# Patient Record
Sex: FEMALE | URBAN - METROPOLITAN AREA
[De-identification: names, ages, dates, MRNs, and addresses within clinical notes are randomized per-mention and may not be internally consistent; named-entity substitution may affect disease eponyms.]

---

## 2023-10-17 ENCOUNTER — APPOINTMENT (RX ONLY)
Dept: URBAN - METROPOLITAN AREA CLINIC 87 | Facility: CLINIC | Age: 43
Setting detail: DERMATOLOGY
End: 2023-10-17

## 2023-10-17 DIAGNOSIS — L65.9 NONSCARRING HAIR LOSS, UNSPECIFIED: ICD-10-CM | Status: INADEQUATELY CONTROLLED

## 2023-10-17 PROCEDURE — ? ORDER TESTS

## 2023-10-17 PROCEDURE — ? PRESCRIPTION MEDICATION MANAGEMENT

## 2023-10-17 PROCEDURE — ? ADDITIONAL NOTES

## 2023-10-17 PROCEDURE — 99204 OFFICE O/P NEW MOD 45 MIN: CPT

## 2023-10-17 PROCEDURE — ? COUNSELING

## 2023-10-17 ASSESSMENT — LOCATION SIMPLE DESCRIPTION DERM
LOCATION SIMPLE: RIGHT FOREHEAD
LOCATION SIMPLE: LEFT SCALP
LOCATION SIMPLE: SCALP

## 2023-10-17 ASSESSMENT — LOCATION ZONE DERM
LOCATION ZONE: SCALP
LOCATION ZONE: FACE

## 2023-10-17 ASSESSMENT — LOCATION DETAILED DESCRIPTION DERM
LOCATION DETAILED: RIGHT SUPERIOR FOREHEAD
LOCATION DETAILED: LEFT CENTRAL FRONTAL SCALP
LOCATION DETAILED: LEFT SUPERIOR PARIETAL SCALP

## 2023-10-17 NOTE — PROCEDURE: ORDER TESTS
Bill For Surgical Tray: no
Billing Type: Third-Party Bill
Performing Laboratory: -[id number]
Expected Date Of Service: 10/17/2023
Lab Facility: [id number]

## 2023-10-17 NOTE — PROCEDURE: PRESCRIPTION MEDICATION MANAGEMENT
Render In Strict Bullet Format?: No
Initiate Treatment: Pending blood work : oral minoxidil or Spironolactone
Detail Level: Zone
Plan: Hair loss hand out given\\n\\nPt recommended Nutrafol and Butcher\\n\\nPt advised to reduce frequency of dying hair

## 2023-10-17 NOTE — HPI: HAIR LOSS
How Did The Hair Loss Occur?: gradual in onset
How Severe Is Your Hair Loss?: moderate
Additional History: Stated was traveling and noticed during that time they were shedding even more and a  mentioned how much hair was shedding. Had Covid two years ago

## 2023-10-17 NOTE — PROCEDURE: ADDITIONAL NOTES
Detail Level: Simple
Additional Notes: Pt states hx of thyroid medications for a year and did not notice improvement
Render Risk Assessment In Note?: no

## 2023-10-25 ENCOUNTER — RX ONLY (OUTPATIENT)
Age: 43
Setting detail: RX ONLY
End: 2023-10-25

## 2023-10-25 RX ORDER — MINOXIDIL 2.5 MG/1
TABLET ORAL
Qty: 30 | Refills: 1 | Status: ERX | COMMUNITY
Start: 2023-10-25

## 2023-11-01 ENCOUNTER — APPOINTMENT (RX ONLY)
Dept: URBAN - METROPOLITAN AREA CLINIC 87 | Facility: CLINIC | Age: 43
Setting detail: DERMATOLOGY
End: 2023-11-01

## 2023-11-01 DIAGNOSIS — Z71.89 OTHER SPECIFIED COUNSELING: ICD-10-CM

## 2023-11-01 DIAGNOSIS — L82.1 OTHER SEBORRHEIC KERATOSIS: ICD-10-CM

## 2023-11-01 DIAGNOSIS — L81.9 DISORDER OF PIGMENTATION, UNSPECIFIED: ICD-10-CM | Status: INADEQUATELY CONTROLLED

## 2023-11-01 PROCEDURE — ? PRESCRIPTION

## 2023-11-01 PROCEDURE — ? PRESCRIPTION MEDICATION MANAGEMENT

## 2023-11-01 PROCEDURE — ? ADDITIONAL NOTES

## 2023-11-01 PROCEDURE — ? COUNSELING

## 2023-11-01 PROCEDURE — ? SUNSCREEN RECOMMENDATIONS

## 2023-11-01 PROCEDURE — 99214 OFFICE O/P EST MOD 30 MIN: CPT

## 2023-11-01 RX ORDER — TRETIONIN 0.25 MG/G
CREAM TOPICAL DAILY
Qty: 45 | Refills: 1 | Status: ERX | COMMUNITY
Start: 2023-11-01

## 2023-11-01 RX ADMIN — TRETIONIN: 0.25 CREAM TOPICAL at 00:00

## 2023-11-01 ASSESSMENT — LOCATION SIMPLE DESCRIPTION DERM
LOCATION SIMPLE: NOSE
LOCATION SIMPLE: RIGHT FOREHEAD
LOCATION SIMPLE: RIGHT CHEEK
LOCATION SIMPLE: LEFT CHEEK

## 2023-11-01 ASSESSMENT — LOCATION DETAILED DESCRIPTION DERM
LOCATION DETAILED: RIGHT INFERIOR CENTRAL MALAR CHEEK
LOCATION DETAILED: LEFT INFERIOR CENTRAL MALAR CHEEK
LOCATION DETAILED: RIGHT INFERIOR MEDIAL FOREHEAD
LOCATION DETAILED: NASAL DORSUM

## 2023-11-01 ASSESSMENT — SEVERITY ASSESSMENT: SEVERITY: MODERATE

## 2023-11-01 ASSESSMENT — PAIN INTENSITY VAS: HOW INTENSE IS YOUR PAIN 0 BEING NO PAIN, 10 BEING THE MOST SEVERE PAIN POSSIBLE?: NO PAIN

## 2023-11-01 ASSESSMENT — LOCATION ZONE DERM
LOCATION ZONE: FACE
LOCATION ZONE: NOSE

## 2023-11-01 NOTE — PROCEDURE: ADDITIONAL NOTES
Render Risk Assessment In Note?: no
Additional Notes: Discussed with pt treatment options including LN2 and Hyfrecator.
Detail Level: Simple

## 2023-11-01 NOTE — PROCEDURE: PRESCRIPTION MEDICATION MANAGEMENT
Initiate Treatment: tretinoin 0.025 % topical cream Daily\\nApply pea size to face 2-3 times a week at bedtime. Increase to nightly as tolerated\\n\\nOTC - Vitamin C \\nApply to face QAM.\\n\\nOTC - Sunscreen spf 30 or higher.
Render In Strict Bullet Format?: No
Detail Level: Zone
Plan: Discussed skin care regimen with pt. Including Botox, retinoids and sunscreen.\\nRecommended using sunscreen SPF 30 or higher, Vitamin C (La RochePosay or Skinceuticals C E Ferulic), Retinol.

## 2024-07-03 ENCOUNTER — APPOINTMENT (RX ONLY)
Dept: URBAN - METROPOLITAN AREA CLINIC 87 | Facility: CLINIC | Age: 44
Setting detail: DERMATOLOGY
End: 2024-07-03

## 2024-07-03 DIAGNOSIS — L81.9 DISORDER OF PIGMENTATION, UNSPECIFIED: ICD-10-CM | Status: INADEQUATELY CONTROLLED

## 2024-07-03 DIAGNOSIS — L65.9 NONSCARRING HAIR LOSS, UNSPECIFIED: ICD-10-CM

## 2024-07-03 PROCEDURE — ? PRESCRIPTION

## 2024-07-03 PROCEDURE — ? COUNSELING

## 2024-07-03 PROCEDURE — 99214 OFFICE O/P EST MOD 30 MIN: CPT

## 2024-07-03 PROCEDURE — ? PRESCRIPTION MEDICATION MANAGEMENT

## 2024-07-03 RX ORDER — MINOXIDIL 2.5 MG/1
1 TABLET ORAL DAILY
Qty: 45 | Refills: 3 | Status: ERX

## 2024-07-03 ASSESSMENT — LOCATION DETAILED DESCRIPTION DERM
LOCATION DETAILED: LEFT SUPERIOR PARIETAL SCALP
LOCATION DETAILED: RIGHT SUPERIOR FOREHEAD
LOCATION DETAILED: RIGHT INFERIOR CENTRAL MALAR CHEEK
LOCATION DETAILED: LEFT CENTRAL FRONTAL SCALP
LOCATION DETAILED: LEFT INFERIOR CENTRAL MALAR CHEEK
LOCATION DETAILED: RIGHT INFERIOR MEDIAL FOREHEAD

## 2024-07-03 ASSESSMENT — LOCATION SIMPLE DESCRIPTION DERM
LOCATION SIMPLE: RIGHT CHEEK
LOCATION SIMPLE: SCALP
LOCATION SIMPLE: LEFT CHEEK
LOCATION SIMPLE: RIGHT FOREHEAD
LOCATION SIMPLE: LEFT SCALP

## 2024-07-03 ASSESSMENT — LOCATION ZONE DERM
LOCATION ZONE: SCALP
LOCATION ZONE: FACE

## 2024-07-03 NOTE — PROCEDURE: PRESCRIPTION MEDICATION MANAGEMENT
Render In Strict Bullet Format?: No
Continue Regimen: minoxidil 2.5 mg tablet daily - Take 1/2 tablet po every other day.
Detail Level: Zone
Plan: Patient having hypertrichosis , advised she can try 1/2 tab QOD
Initiate Treatment: ME - LC (8% azelaic acid, 3% tranexamic acid, 4% niacinamide, 0.05% tretinoin, 3% kojic acid (bulk))-Apply pea-sized amount to entire face 3 times/week.
Discontinue Regimen: tretinoin 0.025 % topical cream Daily - Apply pea size to face 2-3 times a week at bedtime. Increase to nightly as tolerated
Samples Given: Elta MD UV Tinted
Plan: Discussed skin care regimen with pt. Including, retinoids and sunscreen.\\nRecommended using sunscreen SPF 30 or higher, Elta MD UV Tinted or Black girl sunscreen, Vitamin C (La RochePosay or Skinceuticals C E Ferulic), Retinol.